# Patient Record
Sex: MALE | URBAN - METROPOLITAN AREA
[De-identification: names, ages, dates, MRNs, and addresses within clinical notes are randomized per-mention and may not be internally consistent; named-entity substitution may affect disease eponyms.]

---

## 2022-07-09 ENCOUNTER — TELEPHONE ENCOUNTER (OUTPATIENT)
Dept: URBAN - METROPOLITAN AREA CLINIC 121 | Facility: CLINIC | Age: 47
End: 2022-07-09

## 2022-07-10 ENCOUNTER — TELEPHONE ENCOUNTER (OUTPATIENT)
Dept: URBAN - METROPOLITAN AREA CLINIC 121 | Facility: CLINIC | Age: 47
End: 2022-07-10

## 2022-07-10 RX ORDER — FLUTICASONE PROPIONATE 50 UG/1
SPRAY, METERED NASAL
Refills: 0 | Status: ACTIVE | COMMUNITY
Start: 2010-08-31

## 2024-07-16 ENCOUNTER — OFFICE VISIT (OUTPATIENT)
Dept: URBAN - METROPOLITAN AREA CLINIC 7 | Facility: CLINIC | Age: 49
End: 2024-07-16

## 2024-07-16 ENCOUNTER — DASHBOARD ENCOUNTERS (OUTPATIENT)
Age: 49
End: 2024-07-16

## 2024-07-16 ENCOUNTER — TELEPHONE ENCOUNTER (OUTPATIENT)
Dept: URBAN - METROPOLITAN AREA CLINIC 7 | Facility: CLINIC | Age: 49
End: 2024-07-16

## 2024-07-16 RX ORDER — MONTELUKAST SODIUM 10 MG/1
TABLET, FILM COATED ORAL
Qty: 30 TABLET | Status: ACTIVE | COMMUNITY

## 2024-07-16 RX ORDER — CLONAZEPAM 1 MG/1
TABLET ORAL
Qty: 30 TABLET | Status: ACTIVE | COMMUNITY

## 2024-07-16 RX ORDER — ALBUTEROL SULFATE 90 UG/1
INHALE 2 PUFFS BY MOUTH EVERY 4 HOURS AS NEEDED AEROSOL, METERED RESPIRATORY (INHALATION)
Qty: 18 GRAM | Refills: 0 | Status: ACTIVE | COMMUNITY

## 2024-07-16 RX ORDER — DICLOFENAC SODIUM 75 MG/1
TAKE 1 TABLET BY MOUTH TWICE DAILY WITH MEALS. DO NOT TAKE ANY OTHER ORAL ANTI-INFLAMMATORIES WHILE TAKING THIS MEDICATION TABLET, DELAYED RELEASE ORAL
Qty: 60 EACH | Refills: 2 | Status: ACTIVE | COMMUNITY

## 2024-07-16 RX ORDER — PRAZOSIN HYDROCHLORIDE 1 MG/1
CAPSULE ORAL
Qty: 30 CAPSULE | Status: ACTIVE | COMMUNITY

## 2024-07-16 RX ORDER — FLUTICASONE PROPIONATE 50 UG/1
SPRAY, METERED NASAL
Refills: 0 | Status: ACTIVE | COMMUNITY
Start: 2010-08-31

## 2024-07-16 RX ORDER — ALLOPURINOL 100 MG/1
TABLET ORAL
Qty: 90 TABLET | Status: ACTIVE | COMMUNITY

## 2024-07-16 RX ORDER — QUETIAPINE FUMARATE 100 MG/1
TABLET ORAL
Qty: 60 TABLET | Status: ACTIVE | COMMUNITY

## 2024-07-16 RX ORDER — OMEPRAZOLE 40 MG/1
CAPSULE, DELAYED RELEASE ORAL
Qty: 180 CAPSULE | Status: ACTIVE | COMMUNITY

## 2024-07-16 RX ORDER — TESTOSTERONE CYPIONATE 200 MG/ML
INJECT 1 ML INTRAMUSCULARLY ONCE A WEEK INJECTION, SOLUTION INTRAMUSCULAR
Qty: 12 MILLILITER | Refills: 0 | Status: ACTIVE | COMMUNITY

## 2024-07-16 RX ORDER — BUDESONIDE AND FORMOTEROL FUMARATE 160; 4.5 UG/1; UG/1
AEROSOL, METERED RESPIRATORY (INHALATION)
Qty: 10.3 GRAM | Status: ACTIVE | COMMUNITY

## 2024-07-16 RX ORDER — GABAPENTIN 300 MG/1
TAKE 1 CAPSULE BY MOUTH AT BEDTIME CAPSULE ORAL
Qty: 30 EACH | Refills: 4 | Status: ACTIVE | COMMUNITY

## 2024-07-16 NOTE — HPI-TODAY'S VISIT:
Patient is new to the practice and is being evaluated for possible reflux symptoms.  He does seem that he had an EGD and colonoscopy locally back in 2010.  He had erosive gastropathy and internal hemorrhoids.  It is unclear what the pathology showed.  He was followed at that time for reflux as well is abdominal distention and gas.  He did have a CT scan of the abdomen pelvis done at that time in July 2010 which demonstrated splenomegaly and a single prominent periaortic lymph node, and no other intra-abdominal pathology.  He was seen by primary provider in the spring 2024 with ongoing reflux symptoms despite omeprazole 40 mg once daily.  He was also having bloating and irritation throat clearing and hoarseness.  His symptoms were daily.  He was drinking Pepto-Bismol.  He was switched to pantoprazole and did better with this medication.  He has been referred for possible endoscopy.